# Patient Record
Sex: FEMALE | URBAN - METROPOLITAN AREA
[De-identification: names, ages, dates, MRNs, and addresses within clinical notes are randomized per-mention and may not be internally consistent; named-entity substitution may affect disease eponyms.]

---

## 2020-02-14 ENCOUNTER — APPOINTMENT (OUTPATIENT)
Age: 35
Setting detail: DERMATOLOGY
End: 2020-02-14

## 2020-02-14 DIAGNOSIS — L98.8 OTHER SPECIFIED DISORDERS OF THE SKIN AND SUBCUTANEOUS TISSUE: ICD-10-CM

## 2020-02-14 PROCEDURE — OTHER ADDITIONAL NOTES: OTHER

## 2020-02-14 NOTE — PROCEDURE: ADDITIONAL NOTES
Additional Notes: [employer] 34 units. Lot [id number]. Exp 2021 Additional Notes: [employer] 34 units. Lot [id number]. Exp 2021 wheezes

## 2020-02-28 ENCOUNTER — APPOINTMENT (OUTPATIENT)
Age: 35
Setting detail: DERMATOLOGY
End: 2020-02-28

## 2020-02-28 DIAGNOSIS — Z41.9 ENCOUNTER FOR PROCEDURE FOR PURPOSES OTHER THAN REMEDYING HEALTH STATE, UNSPECIFIED: ICD-10-CM

## 2020-02-28 PROCEDURE — OTHER ADDITIONAL NOTES: OTHER

## 2020-02-28 PROCEDURE — 99211 OFF/OP EST MAY X REQ PHY/QHP: CPT

## 2020-06-04 ENCOUNTER — APPOINTMENT (OUTPATIENT)
Age: 35
Setting detail: DERMATOLOGY
End: 2020-06-15

## 2020-06-04 VITALS — TEMPERATURE: 98.4 F

## 2020-06-04 DIAGNOSIS — L98.8 OTHER SPECIFIED DISORDERS OF THE SKIN AND SUBCUTANEOUS TISSUE: ICD-10-CM

## 2020-06-04 PROCEDURE — OTHER ADDITIONAL NOTES: OTHER

## 2020-06-04 NOTE — PROCEDURE: ADDITIONAL NOTES
Additional Notes: [employer] 34 units. Lot number [id number], exp 04/2022 Additional Notes: [employer] 34 units. Lot number [id number], exp 04/2022

## 2020-09-03 ENCOUNTER — APPOINTMENT (OUTPATIENT)
Age: 35
Setting detail: DERMATOLOGY
End: 2020-09-11

## 2020-09-03 VITALS — TEMPERATURE: 97.5 F

## 2020-09-03 DIAGNOSIS — L98.8 OTHER SPECIFIED DISORDERS OF THE SKIN AND SUBCUTANEOUS TISSUE: ICD-10-CM

## 2020-09-03 PROCEDURE — OTHER ADDITIONAL NOTES: OTHER

## 2020-09-03 NOTE — PROCEDURE: ADDITIONAL NOTES
Additional Notes: [employer] 36 units. Lot number [id number] exp05/2022 Additional Notes: [employer] 36 units. Lot number [id number] exp05/2022

## 2020-11-30 ENCOUNTER — APPOINTMENT (OUTPATIENT)
Age: 35
Setting detail: DERMATOLOGY
End: 2020-12-14

## 2020-11-30 VITALS — TEMPERATURE: 98.5 F

## 2020-11-30 DIAGNOSIS — L98.8 OTHER SPECIFIED DISORDERS OF THE SKIN AND SUBCUTANEOUS TISSUE: ICD-10-CM

## 2020-11-30 PROCEDURE — OTHER ADDITIONAL NOTES: OTHER

## 2020-11-30 NOTE — PROCEDURE: ADDITIONAL NOTES
Additional Notes: [employer] 36 units.  Lot number [id number] exp 07/2022 Additional Notes: [employer] 36 units.  Lot number [id number] exp 07/2022

## 2021-03-08 ENCOUNTER — APPOINTMENT (OUTPATIENT)
Age: 36
Setting detail: DERMATOLOGY
End: 2021-08-23

## 2021-03-08 VITALS — TEMPERATURE: 98.6 F

## 2021-03-08 DIAGNOSIS — L98.8 OTHER SPECIFIED DISORDERS OF THE SKIN AND SUBCUTANEOUS TISSUE: ICD-10-CM

## 2021-03-08 PROCEDURE — OTHER ADDITIONAL NOTES: OTHER
